# Patient Record
Sex: FEMALE | Race: BLACK OR AFRICAN AMERICAN | Employment: UNEMPLOYED | URBAN - METROPOLITAN AREA
[De-identification: names, ages, dates, MRNs, and addresses within clinical notes are randomized per-mention and may not be internally consistent; named-entity substitution may affect disease eponyms.]

---

## 2024-08-25 ENCOUNTER — APPOINTMENT (OUTPATIENT)
Dept: MRI IMAGING | Facility: HOSPITAL | Age: 74
End: 2024-08-25
Attending: STUDENT IN AN ORGANIZED HEALTH CARE EDUCATION/TRAINING PROGRAM

## 2024-08-25 ENCOUNTER — HOSPITAL ENCOUNTER (EMERGENCY)
Facility: HOSPITAL | Age: 74
Discharge: HOME | End: 2024-08-25
Attending: STUDENT IN AN ORGANIZED HEALTH CARE EDUCATION/TRAINING PROGRAM

## 2024-08-25 VITALS
HEART RATE: 63 BPM | OXYGEN SATURATION: 98 % | BODY MASS INDEX: 37.3 KG/M2 | DIASTOLIC BLOOD PRESSURE: 70 MMHG | SYSTOLIC BLOOD PRESSURE: 155 MMHG | HEIGHT: 60 IN | RESPIRATION RATE: 15 BRPM | TEMPERATURE: 98 F | WEIGHT: 190 LBS

## 2024-08-25 DIAGNOSIS — R53.1 EPISODE OF GENERALIZED WEAKNESS: Primary | ICD-10-CM

## 2024-08-25 LAB
ANION GAP SERPL CALC-SCNC: 8 MMOL/L (ref 0–18)
ATRIAL RATE: 68 BPM
BASOPHILS # BLD AUTO: 0.08 X10(3) UL (ref 0–0.2)
BASOPHILS NFR BLD AUTO: 0.7 %
BUN BLD-MCNC: 12 MG/DL (ref 9–23)
BUN/CREAT SERPL: 10.6 (ref 10–20)
CALCIUM BLD-MCNC: 9.4 MG/DL (ref 8.7–10.4)
CHLORIDE SERPL-SCNC: 108 MMOL/L (ref 98–112)
CO2 SERPL-SCNC: 23 MMOL/L (ref 21–32)
CREAT BLD-MCNC: 1.13 MG/DL
DEPRECATED RDW RBC AUTO: 49 FL (ref 35.1–46.3)
EGFRCR SERPLBLD CKD-EPI 2021: 51 ML/MIN/1.73M2 (ref 60–?)
EOSINOPHIL # BLD AUTO: 0.48 X10(3) UL (ref 0–0.7)
EOSINOPHIL NFR BLD AUTO: 4.4 %
ERYTHROCYTE [DISTWIDTH] IN BLOOD BY AUTOMATED COUNT: 14.9 % (ref 11–15)
GLUCOSE BLD-MCNC: 140 MG/DL (ref 70–99)
HCT VFR BLD AUTO: 43.4 %
HGB BLD-MCNC: 13.8 G/DL
IMM GRANULOCYTES # BLD AUTO: 0.05 X10(3) UL (ref 0–1)
IMM GRANULOCYTES NFR BLD: 0.5 %
LYMPHOCYTES # BLD AUTO: 2.45 X10(3) UL (ref 1–4)
LYMPHOCYTES NFR BLD AUTO: 22.5 %
MCH RBC QN AUTO: 28.5 PG (ref 26–34)
MCHC RBC AUTO-ENTMCNC: 31.8 G/DL (ref 31–37)
MCV RBC AUTO: 89.7 FL
MONOCYTES # BLD AUTO: 0.74 X10(3) UL (ref 0.1–1)
MONOCYTES NFR BLD AUTO: 6.8 %
NEUTROPHILS # BLD AUTO: 7.11 X10 (3) UL (ref 1.5–7.7)
NEUTROPHILS # BLD AUTO: 7.11 X10(3) UL (ref 1.5–7.7)
NEUTROPHILS NFR BLD AUTO: 65.1 %
OSMOLALITY SERPL CALC.SUM OF ELEC: 290 MOSM/KG (ref 275–295)
P AXIS: 65 DEGREES
P-R INTERVAL: 162 MS
PLATELET # BLD AUTO: 308 10(3)UL (ref 150–450)
PLATELETS.RETICULATED NFR BLD AUTO: 3.2 % (ref 0–7)
POTASSIUM SERPL-SCNC: 4.2 MMOL/L (ref 3.5–5.1)
Q-T INTERVAL: 424 MS
QRS DURATION: 68 MS
QTC CALCULATION (BEZET): 450 MS
R AXIS: 37 DEGREES
RBC # BLD AUTO: 4.84 X10(6)UL
SODIUM SERPL-SCNC: 139 MMOL/L (ref 136–145)
T AXIS: 201 DEGREES
VENTRICULAR RATE: 68 BPM
WBC # BLD AUTO: 10.9 X10(3) UL (ref 4–11)

## 2024-08-25 PROCEDURE — 99285 EMERGENCY DEPT VISIT HI MDM: CPT

## 2024-08-25 PROCEDURE — 85025 COMPLETE CBC W/AUTO DIFF WBC: CPT | Performed by: STUDENT IN AN ORGANIZED HEALTH CARE EDUCATION/TRAINING PROGRAM

## 2024-08-25 PROCEDURE — 36415 COLL VENOUS BLD VENIPUNCTURE: CPT

## 2024-08-25 PROCEDURE — 93010 ELECTROCARDIOGRAM REPORT: CPT

## 2024-08-25 PROCEDURE — 70551 MRI BRAIN STEM W/O DYE: CPT | Performed by: STUDENT IN AN ORGANIZED HEALTH CARE EDUCATION/TRAINING PROGRAM

## 2024-08-25 PROCEDURE — 80048 BASIC METABOLIC PNL TOTAL CA: CPT | Performed by: STUDENT IN AN ORGANIZED HEALTH CARE EDUCATION/TRAINING PROGRAM

## 2024-08-25 PROCEDURE — 93005 ELECTROCARDIOGRAM TRACING: CPT

## 2024-08-25 RX ORDER — ASPIRIN 81 MG/1
TABLET, CHEWABLE ORAL DAILY
COMMUNITY

## 2024-08-25 RX ORDER — FUROSEMIDE 40 MG
40 TABLET ORAL 2 TIMES DAILY
COMMUNITY

## 2024-08-25 RX ORDER — NIFEDIPINE 90 MG/1
90 TABLET, FILM COATED, EXTENDED RELEASE ORAL DAILY
COMMUNITY

## 2024-08-25 RX ORDER — ATORVASTATIN CALCIUM 40 MG/1
40 TABLET, FILM COATED ORAL NIGHTLY
COMMUNITY

## 2024-08-25 RX ORDER — LISINOPRIL 40 MG/1
40 TABLET ORAL DAILY
COMMUNITY

## 2024-08-25 RX ORDER — CLOPIDOGREL BISULFATE 75 MG/1
75 TABLET ORAL DAILY
COMMUNITY

## 2024-08-25 RX ORDER — CARVEDILOL 6.25 MG/1
6.25 TABLET ORAL 2 TIMES DAILY WITH MEALS
COMMUNITY

## 2024-08-25 RX ORDER — PREDNISOLONE ACETATE 10 MG/ML
1 SUSPENSION/ DROPS OPHTHALMIC 4 TIMES DAILY
COMMUNITY

## 2024-08-25 RX ORDER — ALBUTEROL SULFATE 90 UG/1
2 AEROSOL, METERED RESPIRATORY (INHALATION) EVERY 6 HOURS PRN
COMMUNITY

## 2024-08-25 RX ORDER — INSULIN GLARGINE 100 [IU]/ML
27 INJECTION, SOLUTION SUBCUTANEOUS NIGHTLY
COMMUNITY

## 2024-08-25 NOTE — ED NOTES
Patient signed out at shift change.  Results for orders placed or performed during the hospital encounter of 08/25/24   Basic Metabolic Panel (8)    Collection Time: 08/25/24  3:36 AM   Result Value Ref Range    Glucose 140 (H) 70 - 99 mg/dL    Sodium 139 136 - 145 mmol/L    Potassium 4.2 3.5 - 5.1 mmol/L    Chloride 108 98 - 112 mmol/L    CO2 23.0 21.0 - 32.0 mmol/L    Anion Gap 8 0 - 18 mmol/L    BUN 12 9 - 23 mg/dL    Creatinine 1.13 (H) 0.55 - 1.02 mg/dL    BUN/CREA Ratio 10.6 10.0 - 20.0    Calcium, Total 9.4 8.7 - 10.4 mg/dL    Calculated Osmolality 290 275 - 295 mOsm/kg    eGFR-Cr 51 (L) >=60 mL/min/1.73m2   CBC With Differential With Platelet    Collection Time: 08/25/24  3:36 AM   Result Value Ref Range    WBC 10.9 4.0 - 11.0 x10(3) uL    RBC 4.84 3.80 - 5.30 x10(6)uL    HGB 13.8 12.0 - 16.0 g/dL    HCT 43.4 35.0 - 48.0 %    MCV 89.7 80.0 - 100.0 fL    MCH 28.5 26.0 - 34.0 pg    MCHC 31.8 31.0 - 37.0 g/dL    RDW-SD 49.0 (H) 35.1 - 46.3 fL    RDW 14.9 11.0 - 15.0 %    .0 150.0 - 450.0 10(3)uL    Immature Platelet Fraction 3.2 0.0 - 7.0 %    Neutrophil Absolute Prelim 7.11 1.50 - 7.70 x10 (3) uL    Neutrophil Absolute 7.11 1.50 - 7.70 x10(3) uL    Lymphocyte Absolute 2.45 1.00 - 4.00 x10(3) uL    Monocyte Absolute 0.74 0.10 - 1.00 x10(3) uL    Eosinophil Absolute 0.48 0.00 - 0.70 x10(3) uL    Basophil Absolute 0.08 0.00 - 0.20 x10(3) uL    Immature Granulocyte Absolute 0.05 0.00 - 1.00 x10(3) uL    Neutrophil % 65.1 %    Lymphocyte % 22.5 %    Monocyte % 6.8 %    Eosinophil % 4.4 %    Basophil % 0.7 %    Immature Granulocyte % 0.5 %   Scan slide    Collection Time: 08/25/24  3:36 AM   Result Value Ref Range    Slide Review       Platelets reviewed on smear. No giant platelets or platelet clumps observed.   EKG 12 Lead    Collection Time: 08/25/24  6:06 AM   Result Value Ref Range    Ventricular rate 68 BPM    Atrial rate 68 BPM    P-R Interval 162 ms    QRS Duration 68 ms    Q-T Interval 424 ms    QTC  Calculation (Bezet) 450 ms    P Axis 65 degrees    R Axis 37 degrees    T Axis 201 degrees     Vitals:    08/25/24 0945   BP: 155/70   Pulse: 63   Resp: 15   Temp:      MRI BRAIN WO ACUTE (3) SEQUENCE (CPT=70551)    Result Date: 8/25/2024  CONCLUSION:  1. No acute infarct or hemorrhage. 2. A left Virgen clinoid/perisylvian ferromagnetic artifact likely related to a aneurysm clip.  Correlate with history or CT. 3. Previous left temporal craniotomy. 4. 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 5. Moderate to advanced changes of chronic small vessel disease in cerebral white matter.    Dictated by (CST): Bill Khan MD on 8/25/2024 at 7:58 AM     Finalized by (CST): Bill Khan MD on 8/25/2024 at 8:04 AM               I was asked to follow on an MRI brain and if negative, discharge this patient.  74-year-old female with history of diabetes, asthma, hypertension, hyperlipidemia, history of aneurysm status post clip with an episode of generalized weakness early this morning.  States that she stood up after sitting in a chair for a long period of time and felt lightheaded.  No vertigo.  No hemianopsia.  States that she feels well now.  Neurologically and vascularly intact.  Daughter at bedside states that patient is at baseline.  Patient takes Plavix daily.  Ambulating steady here in the emergency department    Cranial nerves 3-12 intact.    5/5 bilateral finger , biceps, triceps, leg extension/flexion, dorsiflexion/plantarflexion.  Sensory function intact symmetrically bilaterally to face, upper extremities and lower extremities to soft touch.  Normal finger-to-nose.  Steady gait  Negative pronator drift    No chest pain, shortness of breath.  MRI negative for acute CVA.  Patient and daughter are comfortable with discharge, requesting discharge.  I have encouraged them to follow with primary care provider in the next few days.  Strict return precautions given.    [place] CVA, dissection, meningitis, atypical ACS,   Dysrhythmia, encephalopathy among other life-threatening medical conditions considered and seems unlikely given patient's history, exam, and appearance.    Patient is non toxic appearing, is in no distress, hemodynamically stable.  Pt agrees and is aware of plan.

## 2024-08-25 NOTE — DISCHARGE INSTRUCTIONS
Your symptoms do not seem consistent with stroke today however you had an episode of generalized weakness.  Please continue taking your antiplatelet medications.   follow-up with your primary care provider.    Return to emergency department for headache, vomiting, changes in mentation, weakness, numbness, losing stool/urine on yourself.  Please follow with your primary care provider in the next few days for reevaluation  The Emergency Department is not intended to be a substitute for an effort to provide complete medical care. The imaging, if any, have often been interpreted on a preliminary basis pending final reading by the radiologist. If your blood pressure was greater than 140/90, please have this blood pressure rechecked by your primary care provider vignesh the next few days. You will benefit from a further discussion of lifestyle modifications that include Weight Reduction - Dietary Sodium Restriction - Increased Physical Activity and Moderation in alcohol (ETOH) Consumption. If possible check your pressure at home and keep a blood pressure log to bring to your physician.      Results for orders placed or performed during the hospital encounter of 08/25/24   Basic Metabolic Panel (8)    Collection Time: 08/25/24  3:36 AM   Result Value Ref Range    Glucose 140 (H) 70 - 99 mg/dL    Sodium 139 136 - 145 mmol/L    Potassium 4.2 3.5 - 5.1 mmol/L    Chloride 108 98 - 112 mmol/L    CO2 23.0 21.0 - 32.0 mmol/L    Anion Gap 8 0 - 18 mmol/L    BUN 12 9 - 23 mg/dL    Creatinine 1.13 (H) 0.55 - 1.02 mg/dL    BUN/CREA Ratio 10.6 10.0 - 20.0    Calcium, Total 9.4 8.7 - 10.4 mg/dL    Calculated Osmolality 290 275 - 295 mOsm/kg    eGFR-Cr 51 (L) >=60 mL/min/1.73m2   CBC With Differential With Platelet    Collection Time: 08/25/24  3:36 AM   Result Value Ref Range    WBC 10.9 4.0 - 11.0 x10(3) uL    RBC 4.84 3.80 - 5.30 x10(6)uL    HGB 13.8 12.0 - 16.0 g/dL    HCT 43.4 35.0 - 48.0 %    MCV 89.7 80.0 - 100.0 fL    MCH 28.5  26.0 - 34.0 pg    MCHC 31.8 31.0 - 37.0 g/dL    RDW-SD 49.0 (H) 35.1 - 46.3 fL    RDW 14.9 11.0 - 15.0 %    .0 150.0 - 450.0 10(3)uL    Immature Platelet Fraction 3.2 0.0 - 7.0 %    Neutrophil Absolute Prelim 7.11 1.50 - 7.70 x10 (3) uL    Neutrophil Absolute 7.11 1.50 - 7.70 x10(3) uL    Lymphocyte Absolute 2.45 1.00 - 4.00 x10(3) uL    Monocyte Absolute 0.74 0.10 - 1.00 x10(3) uL    Eosinophil Absolute 0.48 0.00 - 0.70 x10(3) uL    Basophil Absolute 0.08 0.00 - 0.20 x10(3) uL    Immature Granulocyte Absolute 0.05 0.00 - 1.00 x10(3) uL    Neutrophil % 65.1 %    Lymphocyte % 22.5 %    Monocyte % 6.8 %    Eosinophil % 4.4 %    Basophil % 0.7 %    Immature Granulocyte % 0.5 %   Scan slide    Collection Time: 08/25/24  3:36 AM   Result Value Ref Range    Slide Review       Platelets reviewed on smear. No giant platelets or platelet clumps observed.   EKG 12 Lead    Collection Time: 08/25/24  6:06 AM   Result Value Ref Range    Ventricular rate 68 BPM    Atrial rate 68 BPM    P-R Interval 162 ms    QRS Duration 68 ms    Q-T Interval 424 ms    QTC Calculation (Bezet) 450 ms    P Axis 65 degrees    R Axis 37 degrees    T Axis 201 degrees     MRI BRAIN WO ACUTE (3) SEQUENCE (CPT=70551)    Result Date: 8/25/2024  CONCLUSION:  1. No acute infarct or hemorrhage. 2. A left Virgen clinoid/perisylvian ferromagnetic artifact likely related to a aneurysm clip.  Correlate with history or CT. 3. Previous left temporal craniotomy. 4. 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 5. Moderate to advanced changes of chronic small vessel disease in cerebral white matter.    Dictated by (CST): Bill Khan MD on 8/25/2024 at 7:58 AM     Finalized by (CST): Bill Khan MD on 8/25/2024 at 8:04 AM

## 2024-08-25 NOTE — ED INITIAL ASSESSMENT (HPI)
Pt states that for about 15-20 minutes around 1 am she was not able to see and she couldn't walk.  Pt states that symptoms have resolved and she has been able to walk to the bathroom since then.  Pt currently feels weak.

## 2024-08-25 NOTE — ED PROVIDER NOTES
[facility] Emergency Department Note  Patient: Tabby Cross Age: 74 year old Sex: female      MRN: [id number]  : 1950    Patient Seen in: [facility] [place] Emergency Department    History     Chief Complaint   Patient presents with    Dizziness     Stated Complaint: General Weakness    History obtained from: Patient    74-year-old female with a past medical history of hypertension, hyperlipidemia, diabetes, asthma presenting today for evaluation of episode of generalized weakness.  She states that she was sitting in the chair this evening and upon standing up, felt as if her entire body was weak.  She states that she fell backwards into the chair.  She states that during this episode, she felt as if her vision began going dark.  She states that she not have any visual field deficits but felt as if she could not see out of both of her eyes.  She denies any associated chest pains, palpitations, or shortness of breath.  Denies any numbness or tingling.  Denies any bladder or bowel incontinence.  Denies any changes in her speech.  States that the symptoms lasted approximately 15 to 20 minutes before resolving completely.    Review of Systems:  Review of Systems  Positive for stated complaint: General Weakness. Constitutional and vital signs reviewed. All other systems reviewed and negative except as noted above.    Patient History:  Past Medical History:    Asthma (HCC)    Diabetes (HCC)    Essential hypertension    Hyperlipidemia       Past Surgical History:   Procedure Laterality Date    Brain surgery      Cataract      Heart surgery          No family history on file.    Specific Social Determinants of Health:   Social History     Socioeconomic History    Marital status: Single   Tobacco Use    Smoking status: Never    Smokeless tobacco: Never   Vaping Use    Vaping status: Never Used   Substance and Sexual Activity    Alcohol use: Never    Drug use: Never           PSFH elements reviewed from today and agreed  except as otherwise stated in HPI.    Physical Exam     ED Triage Vitals [08/25/24 0301]   BP (!) 173/68   Pulse 76   Resp 18   Temp 98.2 °F (36.8 °C)   Temp src Oral   SpO2 99 %   O2 Device None (Room air)       Current:BP (!) 173/68   Pulse 76   Temp 98.2 °F (36.8 °C) (Oral)   Resp 18   Ht 152.4 cm (5')   Wt 86.2 kg   SpO2 99%   BMI 37.11 kg/m²         Physical Exam  Constitutional:       General: She is not in acute distress.  HENT:      Head: Normocephalic and atraumatic.      Mouth/Throat:      Mouth: Mucous membranes are moist.   Eyes:      General: No visual field deficit.     Extraocular Movements: Extraocular movements intact.   Cardiovascular:      Rate and Rhythm: Normal rate and regular rhythm.      Heart sounds: Normal heart sounds.   Pulmonary:      Effort: Pulmonary effort is normal. No respiratory distress.      Breath sounds: Normal breath sounds.   Abdominal:      Palpations: Abdomen is soft.      Tenderness: There is no abdominal tenderness.   Skin:     General: Skin is warm and dry.      Capillary Refill: Capillary refill takes less than 2 seconds.      Findings: No rash.   Neurological:      General: No focal deficit present.      Mental Status: She is alert and oriented to person, place, and time.      GCS: GCS eye subscore is 4. GCS verbal subscore is 5. GCS motor subscore is 6.      Cranial Nerves: No cranial nerve deficit, dysarthria or facial asymmetry.      Sensory: No sensory deficit.      Motor: No weakness.   Psychiatric:         Mood and Affect: Mood normal.         Behavior: Behavior normal.         ED Course   Labs:   Labs Reviewed   BASIC METABOLIC PANEL (8) - Abnormal; Notable for the following components:       Result Value    Glucose 140 (*)     Creatinine 1.13 (*)     eGFR-Cr 51 (*)     All other components within normal limits   CBC WITH DIFFERENTIAL WITH PLATELET - Abnormal; Notable for the following components:    RDW-SD 49.0 (*)     All other components within normal  limits   SCAN SLIDE     Radiology findings:  I personally reviewed the images.   No results found.    EKG as interpreted by me: Ventricular rate 68, normal sinus rhythm, normal axis, no OR interval prolongation, narrow QRS, QTc 450 ms, no ST segment elevations or depressions, T wave inversions in 3 through V6, I, II, aVL and aVF; no prior EKG for comparison  Cardiac Monitor: Interpreted by me.   Pulse Readings from Last 1 Encounters:   08/25/24 76   , sinus,     External non-ED records reviewed independently by me: No outpatient records available for review    MDM   74-year-old female with past medical history of hypertension, hyperlipidemia, diabetes, asthma presenting today for evaluation of 20-minute long episode of full body weakness.  Upon arrival emergency department, she is well-appearing and in no acute distress.  Hypertensive but otherwise vitals are within normal limits.  She is no focal neurologic deficits on her examination    Differential diagnoses considered includes, but is not limited to: TIA, electrolyte or metabolic derangement, arrhythmia, vasovagal syncope    Will obtain the following tests: CBC, BMP, MRI brain, EKG  Please see ED course for my independent review of these tests/imaging results.    Initial Medications/Therapeutics administered: None    Chronic conditions affecting care: Hypertension, hyperlipidemia, diabetes, asthma    Workup and medications considered but not ordered: None    Social Determinants of Health that impacted care: None    ED Course as of 08/25/24 0610  ------------------------------------------------------------  Time: 08/25 0558  Comment: Labs reassuring.  MRI pending.  Patient's case signed out to oncoming ED physician.  Suspect discharge home with TIA clinic follow-up if MRI is otherwise unremarkable.            Disposition and Plan     Clinical Impression:  1. Episode of generalized weakness        Disposition:  There is no disposition on file for this  visit.    [facility]  [address]  [facility] [address]  Call  [phone] choose option 1 for general neurology and state that you are following up for TIA      Medications Prescribed:  Current Discharge Medication List            This note may have been created using voice dictation technology and may include inadvertent errors.      Michelle Terry MD  Emergency Medicine

## 2024-08-26 ENCOUNTER — TELEPHONE (OUTPATIENT)
Dept: NEUROLOGY | Facility: CLINIC | Age: 74
End: 2024-08-26

## 2024-08-26 DIAGNOSIS — G45.9 TIA (TRANSIENT ISCHEMIC ATTACK): Primary | ICD-10-CM

## 2024-08-26 NOTE — TELEPHONE ENCOUNTER
TIA CLINIC SCREENING    1. Date of ED visit/TIA diagnosis:  08/25/24   Time of discharge from ED:  1026    2. Is patient currently admitted?  No   If YES - TIA Clinic Appointment not required.      3. Does patient already see an [facility] neurologist?  No  Name:     (if YES - TIA Clinic Appointment not required.  Route message on to patient's neurologist)    4. Patient's current anti-platelet therapy:  Plavix &aspirin 81    5. Patient's current statin therapy:  Atorvastatin 40 MG Oral Tab    6. Has 2D Echo with bubble test been done?  No  Date:      7. Is TIA Clinic Appointment indicated?  Yes     If YES - route encounter to [facility] to schedule patient for appointment NO LATER THAN 48 HOURS AFTER ED DISCHARGE.

## 2024-08-26 NOTE — TELEPHONE ENCOUNTER
Phone call placed to pt, pt daughter, Anny answered this call. Advised Anny of the nature of this call to make an appointment for Tabby to follow up from her visit to the ER on 08/25/24 for a TIA. Anny agreeable to appointment for this pt, RN scheduled this. RN advised that a cardiac echo bubble study has been ordered and the purpose of this testing. Anny verbalized understanding and will await a call from this office to confirm date and time of the pt cardiac study. RN advised Anny that if they have POA paperwork for the pt or any documentation from the ER visit, to please bring this to the appointment, as well as making sure that they fill out the verbal release for this pt so that we can speak to the family about the pt's care. Anny verbalized understanding of our conversation and received this RN's communications regarding the appointment details for the pt upcoming appointment. Done.